# Patient Record
Sex: FEMALE | Race: WHITE | HISPANIC OR LATINO
[De-identification: names, ages, dates, MRNs, and addresses within clinical notes are randomized per-mention and may not be internally consistent; named-entity substitution may affect disease eponyms.]

---

## 2020-08-05 ENCOUNTER — APPOINTMENT (OUTPATIENT)
Dept: INTERNAL MEDICINE | Facility: CLINIC | Age: 20
End: 2020-08-05
Payer: MEDICAID

## 2020-08-05 VITALS
OXYGEN SATURATION: 100 % | DIASTOLIC BLOOD PRESSURE: 68 MMHG | HEIGHT: 65.35 IN | WEIGHT: 163.14 LBS | TEMPERATURE: 98.6 F | SYSTOLIC BLOOD PRESSURE: 121 MMHG | HEART RATE: 89 BPM | BODY MASS INDEX: 26.85 KG/M2

## 2020-08-05 DIAGNOSIS — Z78.9 OTHER SPECIFIED HEALTH STATUS: ICD-10-CM

## 2020-08-05 PROCEDURE — 99385 PREV VISIT NEW AGE 18-39: CPT | Mod: 25

## 2020-08-05 PROCEDURE — 99203 OFFICE O/P NEW LOW 30 MIN: CPT | Mod: 25

## 2020-08-05 PROCEDURE — 36415 COLL VENOUS BLD VENIPUNCTURE: CPT

## 2020-08-07 PROBLEM — Z78.9 CURRENT NON-SMOKER: Status: ACTIVE | Noted: 2020-08-07

## 2020-08-07 PROBLEM — Z78.9 NO KNOWN PROBLEMS: Status: RESOLVED | Noted: 2020-08-07 | Resolved: 2020-08-07

## 2020-08-07 PROBLEM — Z78.9 CURRENT NON-DRINKER OF ALCOHOL: Status: ACTIVE | Noted: 2020-08-07

## 2020-08-07 NOTE — ASSESSMENT
[FreeTextEntry1] : Blood work done today, advised to establish care with gynecologist at the age of 21. Discussed 120,000-calorie intake work deficit for weight loss. Discussed increase activity 300 minutes a week. Check thyroid function levels prolactin levels 24 hour urine cortisol levels her weight gain. Patient does not have features of Cushing's. Consider endocrinology referral if workup unremarkable.

## 2020-08-07 NOTE — HISTORY OF PRESENT ILLNESS
[FreeTextEntry1] : Patient presents for a physical and concern over weight gain [de-identified] : Patient presents for physical concern over weight gain has gained about 20 pounds in 5-6 months.  Denies any changes to diet walks every other day. Strength: Restriction intermittent testing was limited results. Denies any history of obesity. Denies any family history of diabetes. Denies any headaches change in menstrual cycles per patient and change in skin. Has become a little more constipated. Denies any cold intolerance. Denies any over-the-counter medications.

## 2020-08-07 NOTE — HEALTH RISK ASSESSMENT
[Good] : ~his/her~ current health as good [No] : No [0] : 2) Feeling down, depressed, or hopeless: Not at all (0) [] : No [FreeTextEntry1] : weight gain [de-identified] : none [de-identified] : none

## 2020-08-10 ENCOUNTER — APPOINTMENT (OUTPATIENT)
Dept: OPHTHALMOLOGY | Facility: CLINIC | Age: 20
End: 2020-08-10
Payer: MEDICAID

## 2020-08-10 ENCOUNTER — NON-APPOINTMENT (OUTPATIENT)
Age: 20
End: 2020-08-10

## 2020-08-10 LAB
ALBUMIN SERPL ELPH-MCNC: 4.8 G/DL
ALP BLD-CCNC: 82 U/L
ALT SERPL-CCNC: 19 U/L
ANION GAP SERPL CALC-SCNC: 13 MMOL/L
APPEARANCE: CLEAR
APPEARANCE: CLEAR
AST SERPL-CCNC: 21 U/L
BACTERIA: ABNORMAL
BASOPHILS # BLD AUTO: 0.05 K/UL
BASOPHILS NFR BLD AUTO: 0.6 %
BILIRUB SERPL-MCNC: 0.2 MG/DL
BILIRUBIN URINE: NEGATIVE
BILIRUBIN URINE: NEGATIVE
BLOOD URINE: NEGATIVE
BLOOD URINE: NEGATIVE
BUN SERPL-MCNC: 9 MG/DL
CALCIUM SERPL-MCNC: 9.5 MG/DL
CHLORIDE SERPL-SCNC: 104 MMOL/L
CHOLEST SERPL-MCNC: 147 MG/DL
CHOLEST/HDLC SERPL: 2.7 RATIO
CO2 SERPL-SCNC: 24 MMOL/L
COLOR: NORMAL
COLOR: NORMAL
CREAT SERPL-MCNC: 0.81 MG/DL
EOSINOPHIL # BLD AUTO: 0.1 K/UL
EOSINOPHIL NFR BLD AUTO: 1.2 %
ESTIMATED AVERAGE GLUCOSE: 100 MG/DL
GLUCOSE QUALITATIVE U: NEGATIVE
GLUCOSE QUALITATIVE U: NEGATIVE
GLUCOSE SERPL-MCNC: 86 MG/DL
HBA1C MFR BLD HPLC: 5.1 %
HCT VFR BLD CALC: 43.3 %
HDLC SERPL-MCNC: 54 MG/DL
HGB BLD-MCNC: 13.5 G/DL
HYALINE CASTS: 1 /LPF
IGF-1 INTERP: NORMAL
IGF-I BLD-MCNC: 376 NG/ML
IMM GRANULOCYTES NFR BLD AUTO: 0.4 %
KETONES URINE: NEGATIVE
KETONES URINE: NEGATIVE
LDLC SERPL CALC-MCNC: 85 MG/DL
LEUKOCYTE ESTERASE URINE: NEGATIVE
LEUKOCYTE ESTERASE URINE: NEGATIVE
LYMPHOCYTES # BLD AUTO: 2.14 K/UL
LYMPHOCYTES NFR BLD AUTO: 26 %
MAN DIFF?: NORMAL
MCHC RBC-ENTMCNC: 28.8 PG
MCHC RBC-ENTMCNC: 31.2 GM/DL
MCV RBC AUTO: 92.3 FL
MICROSCOPIC-UA: NORMAL
MONOCYTES # BLD AUTO: 0.47 K/UL
MONOCYTES NFR BLD AUTO: 5.7 %
NEUTROPHILS # BLD AUTO: 5.43 K/UL
NEUTROPHILS NFR BLD AUTO: 66.1 %
NITRITE URINE: NEGATIVE
NITRITE URINE: NEGATIVE
PH URINE: 6
PH URINE: 6
PLATELET # BLD AUTO: 243 K/UL
POTASSIUM SERPL-SCNC: 4.4 MMOL/L
PROLACTIN SERPL-MCNC: 20.6 NG/ML
PROT SERPL-MCNC: 7.2 G/DL
PROTEIN URINE: NEGATIVE
PROTEIN URINE: NEGATIVE
RBC # BLD: 4.69 M/UL
RBC # FLD: 12.6 %
RED BLOOD CELLS URINE: 1 /HPF
SODIUM SERPL-SCNC: 141 MMOL/L
SPECIFIC GRAVITY URINE: 1.01
SPECIFIC GRAVITY URINE: 1.01
SQUAMOUS EPITHELIAL CELLS: 7 /HPF
T3FREE SERPL-MCNC: 3.06 PG/ML
T4 FREE SERPL-MCNC: 1.2 NG/DL
TRIGL SERPL-MCNC: 40 MG/DL
TSH SERPL-ACNC: 1.3 UIU/ML
UROBILINOGEN URINE: NORMAL
UROBILINOGEN URINE: NORMAL
WBC # FLD AUTO: 8.22 K/UL
WHITE BLOOD CELLS URINE: 2 /HPF

## 2020-08-10 PROCEDURE — 92004 COMPRE OPH EXAM NEW PT 1/>: CPT

## 2020-08-10 PROCEDURE — 92015 DETERMINE REFRACTIVE STATE: CPT

## 2020-08-16 LAB
CORTIS 24H UR-MCNC: 24 H
CORTIS 24H UR-MRATE: 23 MCG/24 H
SPECIMEN VOL 24H UR: 2075 ML

## 2021-04-26 ENCOUNTER — NON-APPOINTMENT (OUTPATIENT)
Age: 21
End: 2021-04-26

## 2021-04-26 ENCOUNTER — APPOINTMENT (OUTPATIENT)
Dept: INTERNAL MEDICINE | Facility: CLINIC | Age: 21
End: 2021-04-26
Payer: MEDICAID

## 2021-04-26 VITALS
WEIGHT: 160.49 LBS | SYSTOLIC BLOOD PRESSURE: 113 MMHG | BODY MASS INDEX: 26.42 KG/M2 | HEART RATE: 81 BPM | HEIGHT: 65.35 IN | DIASTOLIC BLOOD PRESSURE: 70 MMHG | OXYGEN SATURATION: 98 % | TEMPERATURE: 98.4 F

## 2021-04-26 DIAGNOSIS — R07.9 CHEST PAIN, UNSPECIFIED: ICD-10-CM

## 2021-04-26 PROCEDURE — 99213 OFFICE O/P EST LOW 20 MIN: CPT | Mod: 25

## 2021-04-26 PROCEDURE — 99072 ADDL SUPL MATRL&STAF TM PHE: CPT

## 2021-04-26 PROCEDURE — 93000 ELECTROCARDIOGRAM COMPLETE: CPT

## 2021-04-27 ENCOUNTER — APPOINTMENT (OUTPATIENT)
Dept: ENDOCRINOLOGY | Facility: CLINIC | Age: 21
End: 2021-04-27

## 2021-04-29 LAB
BASOPHILS # BLD AUTO: 0.06 K/UL
BASOPHILS NFR BLD AUTO: 0.9 %
DEPRECATED D DIMER PPP IA-ACNC: <150 NG/ML DDU
EOSINOPHIL # BLD AUTO: 0.1 K/UL
EOSINOPHIL NFR BLD AUTO: 1.5 %
HCT VFR BLD CALC: 43.4 %
HGB BLD-MCNC: 14.1 G/DL
IMM GRANULOCYTES NFR BLD AUTO: 0.7 %
LYMPHOCYTES # BLD AUTO: 2.14 K/UL
LYMPHOCYTES NFR BLD AUTO: 32 %
MAN DIFF?: NORMAL
MCHC RBC-ENTMCNC: 29.6 PG
MCHC RBC-ENTMCNC: 32.5 GM/DL
MCV RBC AUTO: 91 FL
MONOCYTES # BLD AUTO: 0.53 K/UL
MONOCYTES NFR BLD AUTO: 7.9 %
NEUTROPHILS # BLD AUTO: 3.8 K/UL
NEUTROPHILS NFR BLD AUTO: 57 %
PLATELET # BLD AUTO: 251 K/UL
RBC # BLD: 4.77 M/UL
RBC # FLD: 12.2 %
TSH SERPL-ACNC: 2.04 UIU/ML
WBC # FLD AUTO: 6.68 K/UL

## 2021-05-01 NOTE — ASSESSMENT
[FreeTextEntry1] : Advised to follow-up with endocrinology given elevated IGF-I, chest pain may been secondary to anxiety state versus muscular, pretest probability for ACS very low no signs of hypervolemia on exam.  Will assess D-dimer thyroid function test and CBC today.

## 2021-05-01 NOTE — REVIEW OF SYSTEMS
[Palpitations] : no palpitations [Leg Claudication] : no leg claudication [Lower Ext Edema] : no lower extremity edema [Orthopnea] : no orthopnea [Negative] : Heme/Lymph

## 2021-05-01 NOTE — HISTORY OF PRESENT ILLNESS
[FreeTextEntry1] : Patient presents to the office would like to discuss weight [de-identified] : Patient presents to the office will like to discuss weight also did have an episode of left arm discomfort while driving lasted for a few minutes, denies any associated palpitations lightheadedness dizziness or pleuritic chest pain.  Denies any feelings of syncope or presyncope.  Has not been to endocrinologist yet denies any stress or reflux cough fevers.

## 2021-05-27 ENCOUNTER — APPOINTMENT (OUTPATIENT)
Dept: INTERNAL MEDICINE | Facility: CLINIC | Age: 21
End: 2021-05-27
Payer: MEDICAID

## 2021-05-27 VITALS
TEMPERATURE: 97.1 F | OXYGEN SATURATION: 98 % | WEIGHT: 151.11 LBS | HEART RATE: 79 BPM | BODY MASS INDEX: 25.48 KG/M2 | SYSTOLIC BLOOD PRESSURE: 110 MMHG | HEIGHT: 64.76 IN | DIASTOLIC BLOOD PRESSURE: 66 MMHG

## 2021-05-27 DIAGNOSIS — Z71.3 DIETARY COUNSELING AND SURVEILLANCE: ICD-10-CM

## 2021-05-27 PROCEDURE — 99213 OFFICE O/P EST LOW 20 MIN: CPT

## 2021-05-28 ENCOUNTER — TRANSCRIPTION ENCOUNTER (OUTPATIENT)
Age: 21
End: 2021-05-28

## 2021-06-02 PROBLEM — Z71.3 WEIGHT LOSS COUNSELING, ENCOUNTER FOR: Status: ACTIVE | Noted: 2021-04-29

## 2021-06-02 NOTE — HISTORY OF PRESENT ILLNESS
[FreeTextEntry1] : Patient presents for follow-up for weight management [de-identified] : Has tolerated medication well, had some dry mouth however has improved.  Denies any chest pain tightness shortness of breath denies any palpitations headache.  Denies any sleep difficulty.

## 2021-06-02 NOTE — ASSESSMENT
[FreeTextEntry1] : Blood pressure is stable and has lost 9 pounds, continue current management return to the office in 3 months.  Best goal of 10% weight loss.

## 2021-06-23 ENCOUNTER — APPOINTMENT (OUTPATIENT)
Dept: ENDOCRINOLOGY | Facility: CLINIC | Age: 21
End: 2021-06-23

## 2021-07-30 ENCOUNTER — APPOINTMENT (OUTPATIENT)
Dept: INTERNAL MEDICINE | Facility: CLINIC | Age: 21
End: 2021-07-30
Payer: MEDICAID

## 2021-07-30 VITALS
WEIGHT: 145.16 LBS | TEMPERATURE: 98 F | OXYGEN SATURATION: 97 % | BODY MASS INDEX: 24.18 KG/M2 | HEIGHT: 64.96 IN | SYSTOLIC BLOOD PRESSURE: 99 MMHG | DIASTOLIC BLOOD PRESSURE: 64 MMHG | HEART RATE: 90 BPM

## 2021-07-30 PROCEDURE — 99395 PREV VISIT EST AGE 18-39: CPT | Mod: 25

## 2021-07-30 RX ORDER — PHENTERMINE HYDROCHLORIDE 15 MG/1
15 CAPSULE ORAL
Qty: 30 | Refills: 0 | Status: DISCONTINUED | COMMUNITY
Start: 2021-04-29 | End: 2021-07-30

## 2021-08-04 LAB
25(OH)D3 SERPL-MCNC: 21.4 NG/ML
ALBUMIN SERPL ELPH-MCNC: 4.6 G/DL
ALP BLD-CCNC: 74 U/L
ALT SERPL-CCNC: 12 U/L
ANION GAP SERPL CALC-SCNC: 11 MMOL/L
APPEARANCE: CLEAR
APPEARANCE: CLEAR
AST SERPL-CCNC: 16 U/L
BACTERIA: NEGATIVE
BASOPHILS # BLD AUTO: 0.04 K/UL
BASOPHILS NFR BLD AUTO: 0.6 %
BILIRUB SERPL-MCNC: 0.2 MG/DL
BILIRUBIN URINE: NEGATIVE
BILIRUBIN URINE: NEGATIVE
BLOOD URINE: NEGATIVE
BLOOD URINE: NEGATIVE
BUN SERPL-MCNC: 9 MG/DL
CALCIUM SERPL-MCNC: 9.8 MG/DL
CHLORIDE SERPL-SCNC: 102 MMOL/L
CHOLEST SERPL-MCNC: 144 MG/DL
CO2 SERPL-SCNC: 24 MMOL/L
COLOR: YELLOW
COLOR: YELLOW
CREAT SERPL-MCNC: 0.77 MG/DL
EOSINOPHIL # BLD AUTO: 0.08 K/UL
EOSINOPHIL NFR BLD AUTO: 1.2 %
ESTIMATED AVERAGE GLUCOSE: 94 MG/DL
GLUCOSE QUALITATIVE U: NEGATIVE
GLUCOSE QUALITATIVE U: NEGATIVE
GLUCOSE SERPL-MCNC: 71 MG/DL
HBA1C MFR BLD HPLC: 4.9 %
HCT VFR BLD CALC: 39.5 %
HDLC SERPL-MCNC: 49 MG/DL
HGB BLD-MCNC: 13.2 G/DL
HYALINE CASTS: 3 /LPF
IMM GRANULOCYTES NFR BLD AUTO: 0.4 %
KETONES URINE: NORMAL
KETONES URINE: NORMAL
LDLC SERPL CALC-MCNC: 88 MG/DL
LEUKOCYTE ESTERASE URINE: NEGATIVE
LEUKOCYTE ESTERASE URINE: NEGATIVE
LYMPHOCYTES # BLD AUTO: 2.08 K/UL
LYMPHOCYTES NFR BLD AUTO: 30.8 %
MAN DIFF?: NORMAL
MCHC RBC-ENTMCNC: 30.2 PG
MCHC RBC-ENTMCNC: 33.4 GM/DL
MCV RBC AUTO: 90.4 FL
MICROSCOPIC-UA: NORMAL
MONOCYTES # BLD AUTO: 0.52 K/UL
MONOCYTES NFR BLD AUTO: 7.7 %
NEUTROPHILS # BLD AUTO: 4.01 K/UL
NEUTROPHILS NFR BLD AUTO: 59.3 %
NITRITE URINE: NEGATIVE
NITRITE URINE: NEGATIVE
NONHDLC SERPL-MCNC: 95 MG/DL
PH URINE: 5.5
PH URINE: 5.5
PLATELET # BLD AUTO: 233 K/UL
POTASSIUM SERPL-SCNC: 4.6 MMOL/L
PROT SERPL-MCNC: 7 G/DL
PROTEIN URINE: ABNORMAL
PROTEIN URINE: ABNORMAL
RBC # BLD: 4.37 M/UL
RBC # FLD: 12.7 %
RED BLOOD CELLS URINE: 1 /HPF
SODIUM SERPL-SCNC: 136 MMOL/L
SPECIFIC GRAVITY URINE: 1.03
SPECIFIC GRAVITY URINE: 1.03
SQUAMOUS EPITHELIAL CELLS: 2 /HPF
TRIGL SERPL-MCNC: 37 MG/DL
TSH SERPL-ACNC: 1.62 UIU/ML
UROBILINOGEN URINE: NORMAL
UROBILINOGEN URINE: NORMAL
WBC # FLD AUTO: 6.76 K/UL
WHITE BLOOD CELLS URINE: 1 /HPF

## 2021-08-08 NOTE — ASSESSMENT
[FreeTextEntry1] : Blood work done today, congratulated patient on weight loss.  Advised stretching exercises exercise for right shoulder pain.

## 2021-08-08 NOTE — HEALTH RISK ASSESSMENT
[Very Good] : ~his/her~  mood as very good [FreeTextEntry1] : health maintenance [] : No [No] : No [0] : 2) Feeling down, depressed, or hopeless: Not at all (0) [de-identified] : none

## 2021-08-08 NOTE — PHYSICAL EXAM
[Normal] : affect was normal and insight and judgment were intact [de-identified] : myofascial tenderness, right shoulder

## 2021-11-05 ENCOUNTER — APPOINTMENT (OUTPATIENT)
Dept: INTERNAL MEDICINE | Facility: CLINIC | Age: 21
End: 2021-11-05
Payer: MEDICAID

## 2021-11-05 VITALS
HEIGHT: 65.28 IN | TEMPERATURE: 96.9 F | OXYGEN SATURATION: 99 % | BODY MASS INDEX: 24.58 KG/M2 | DIASTOLIC BLOOD PRESSURE: 68 MMHG | HEART RATE: 77 BPM | SYSTOLIC BLOOD PRESSURE: 102 MMHG | WEIGHT: 149.34 LBS

## 2021-11-05 PROCEDURE — 99213 OFFICE O/P EST LOW 20 MIN: CPT

## 2021-11-05 NOTE — HISTORY OF PRESENT ILLNESS
[FreeTextEntry8] : Patient presents to the office due to right upper back pain\par Has been occurring for approximately 1 week denies any inciting events worse with movement denies any fevers chills radicular symptoms weakness numbness\par Denies any history of trauma

## 2021-11-05 NOTE — PHYSICAL EXAM
[Normal] : normal gait, coordination grossly intact, no focal deficits and deep tendon reflexes were 2+ and symmetric [de-identified] : muscular spasm below right scapula

## 2021-11-05 NOTE — ASSESSMENT
[FreeTextEntry1] :  back spasm-pt, stretching exercises, muscle relaxant given discussed potential side effects\par call office if no improvement

## 2021-11-16 ENCOUNTER — TRANSCRIPTION ENCOUNTER (OUTPATIENT)
Age: 21
End: 2021-11-16

## 2021-12-02 ENCOUNTER — APPOINTMENT (OUTPATIENT)
Dept: INTERNAL MEDICINE | Facility: CLINIC | Age: 21
End: 2021-12-02

## 2021-12-15 ENCOUNTER — TRANSCRIPTION ENCOUNTER (OUTPATIENT)
Age: 21
End: 2021-12-15

## 2022-01-12 ENCOUNTER — NON-APPOINTMENT (OUTPATIENT)
Age: 22
End: 2022-01-12

## 2022-01-14 ENCOUNTER — APPOINTMENT (OUTPATIENT)
Dept: INTERNAL MEDICINE | Facility: CLINIC | Age: 22
End: 2022-01-14
Payer: MEDICAID

## 2022-01-14 VITALS
HEART RATE: 76 BPM | BODY MASS INDEX: 24.83 KG/M2 | DIASTOLIC BLOOD PRESSURE: 76 MMHG | OXYGEN SATURATION: 99 % | TEMPERATURE: 98.1 F | HEIGHT: 65 IN | SYSTOLIC BLOOD PRESSURE: 117 MMHG | WEIGHT: 149 LBS

## 2022-01-14 DIAGNOSIS — L50.9 URTICARIA, UNSPECIFIED: ICD-10-CM

## 2022-01-14 PROCEDURE — 99213 OFFICE O/P EST LOW 20 MIN: CPT | Mod: 25

## 2022-01-14 RX ORDER — CYCLOBENZAPRINE HYDROCHLORIDE 5 MG/1
5 TABLET, FILM COATED ORAL
Qty: 15 | Refills: 0 | Status: DISCONTINUED | COMMUNITY
Start: 2021-11-05 | End: 2022-01-14

## 2022-01-14 NOTE — ASSESSMENT
[FreeTextEntry1] : Urticaria possibly secondary to the viral infection or NSAID use\par Currently improved\par If recurs to see allergist\par CBC CMP TSH inflammatory markers ordered\par Continues to have some congestion,-advised Flonase no maxillary tooth ache fevers chills

## 2022-01-14 NOTE — HISTORY OF PRESENT ILLNESS
[FreeTextEntry1] : Patient presents to the office for urticaria [de-identified] : Occurred shortly after COVID, had red raised spots, had swelling of lower lip improved with Benadryl currently feels better denies any joint pains muscle aches\par Was taking NSAIDs denies any changes in food changes in creams lotions detergents

## 2022-01-20 LAB
ALBUMIN SERPL ELPH-MCNC: 4.3 G/DL
ALP BLD-CCNC: 72 U/L
ALT SERPL-CCNC: 17 U/L
ANION GAP SERPL CALC-SCNC: 12 MMOL/L
AST SERPL-CCNC: 15 U/L
BASOPHILS # BLD AUTO: 0.05 K/UL
BASOPHILS NFR BLD AUTO: 0.8 %
BILIRUB SERPL-MCNC: 0.2 MG/DL
BUN SERPL-MCNC: 6 MG/DL
CALCIUM SERPL-MCNC: 9.4 MG/DL
CHLORIDE SERPL-SCNC: 106 MMOL/L
CO2 SERPL-SCNC: 24 MMOL/L
CREAT SERPL-MCNC: 0.9 MG/DL
EOSINOPHIL # BLD AUTO: 0.15 K/UL
EOSINOPHIL NFR BLD AUTO: 2.4 %
ERYTHROCYTE [SEDIMENTATION RATE] IN BLOOD BY WESTERGREN METHOD: < 2 MM/HR
GLUCOSE SERPL-MCNC: 95 MG/DL
HCT VFR BLD CALC: 39.4 %
HGB BLD-MCNC: 13.1 G/DL
IMM GRANULOCYTES NFR BLD AUTO: 0.3 %
LYMPHOCYTES # BLD AUTO: 1.75 K/UL
LYMPHOCYTES NFR BLD AUTO: 27.8 %
MAN DIFF?: NORMAL
MCHC RBC-ENTMCNC: 30.5 PG
MCHC RBC-ENTMCNC: 33.2 GM/DL
MCV RBC AUTO: 91.8 FL
MONOCYTES # BLD AUTO: 0.42 K/UL
MONOCYTES NFR BLD AUTO: 6.7 %
NEUTROPHILS # BLD AUTO: 3.91 K/UL
NEUTROPHILS NFR BLD AUTO: 62 %
PLATELET # BLD AUTO: 263 K/UL
POTASSIUM SERPL-SCNC: 4.8 MMOL/L
PROT SERPL-MCNC: 6.7 G/DL
RBC # BLD: 4.29 M/UL
RBC # FLD: 13 %
SODIUM SERPL-SCNC: 141 MMOL/L
TSH SERPL-ACNC: 1.54 UIU/ML
WBC # FLD AUTO: 6.3 K/UL

## 2022-02-17 ENCOUNTER — TRANSCRIPTION ENCOUNTER (OUTPATIENT)
Age: 22
End: 2022-02-17

## 2022-03-28 ENCOUNTER — NON-APPOINTMENT (OUTPATIENT)
Age: 22
End: 2022-03-28

## 2022-03-28 ENCOUNTER — APPOINTMENT (OUTPATIENT)
Dept: ENDOCRINOLOGY | Facility: CLINIC | Age: 22
End: 2022-03-28
Payer: MEDICAID

## 2022-03-28 VITALS
DIASTOLIC BLOOD PRESSURE: 75 MMHG | SYSTOLIC BLOOD PRESSURE: 122 MMHG | OXYGEN SATURATION: 96 % | HEIGHT: 65 IN | WEIGHT: 149.91 LBS | BODY MASS INDEX: 24.98 KG/M2 | HEART RATE: 75 BPM

## 2022-03-28 DIAGNOSIS — R79.89 OTHER SPECIFIED ABNORMAL FINDINGS OF BLOOD CHEMISTRY: ICD-10-CM

## 2022-03-28 DIAGNOSIS — E55.9 VITAMIN D DEFICIENCY, UNSPECIFIED: ICD-10-CM

## 2022-03-28 PROCEDURE — 99202 OFFICE O/P NEW SF 15 MIN: CPT

## 2022-03-28 NOTE — REVIEW OF SYSTEMS
[Constipation] : constipation [Gas/Bloating] : gas/bloating [All other systems negative] : All other systems negative [FreeTextEntry2] : Increase in height

## 2022-03-28 NOTE — ASSESSMENT
[FreeTextEntry1] : This is a 22-year-old female with vitamin D insufficiency, here for evaluation of elevated IGF-I level.\par She had routine blood work in August 2020 which showed IGF-1 level of 376.\par She states she has noticed increase in height.  Denies change in hands, feet, or jaw.\par Check repeat IGF-1. No clinical signs of acromegaly.  \par She is not currently taking vitamin D. Check 25 vit D. \par

## 2022-03-28 NOTE — PHYSICAL EXAM
[Alert] : alert [Well Nourished] : well nourished [Healthy Appearance] : healthy appearance [No Acute Distress] : no acute distress [Well Developed] : well developed [Normal Voice/Communication] : normal voice communication [Normal Sclera/Conjunctiva] : normal sclera/conjunctiva [No Proptosis] : no proptosis [No Neck Mass] : no neck mass was observed [No LAD] : no lymphadenopathy [Supple] : the neck was supple [Thyroid Not Enlarged] : the thyroid was not enlarged [No Respiratory Distress] : no respiratory distress [Normal Rate] : heart rate was normal [Normal Affect] : the affect was normal [Normal Insight/Judgement] : insight and judgment were intact [Normal Mood] : the mood was normal

## 2022-03-28 NOTE — HISTORY OF PRESENT ILLNESS
[FreeTextEntry1] : CC: Abnormal hormone level.\par This is a 22-year-old female with vitamin D insufficiency, here for evaluation of elevated IGF-I level.\par She had routine blood work in August 2020 which showed IGF-1 level of 376.\par She states she has noticed increase in height.  Denies change in hands, feet, or jaw.\par 24-hour urine cortisol normal in the past.\par She is not currently taking vitamin D.\par She reports weight loss on phentermine.\par She has regular menstrual periods.\par She is not currently trying to conceive.

## 2022-05-02 ENCOUNTER — TRANSCRIPTION ENCOUNTER (OUTPATIENT)
Age: 22
End: 2022-05-02

## 2022-06-24 ENCOUNTER — TRANSCRIPTION ENCOUNTER (OUTPATIENT)
Age: 22
End: 2022-06-24

## 2022-08-12 ENCOUNTER — APPOINTMENT (OUTPATIENT)
Dept: INTERNAL MEDICINE | Facility: CLINIC | Age: 22
End: 2022-08-12

## 2022-08-12 ENCOUNTER — NON-APPOINTMENT (OUTPATIENT)
Age: 22
End: 2022-08-12

## 2022-08-12 VITALS
OXYGEN SATURATION: 98 % | HEART RATE: 75 BPM | BODY MASS INDEX: 25.33 KG/M2 | TEMPERATURE: 97.3 F | WEIGHT: 152 LBS | HEIGHT: 65 IN | SYSTOLIC BLOOD PRESSURE: 123 MMHG | DIASTOLIC BLOOD PRESSURE: 80 MMHG

## 2022-08-12 DIAGNOSIS — M25.511 PAIN IN RIGHT SHOULDER: ICD-10-CM

## 2022-08-12 PROCEDURE — 99395 PREV VISIT EST AGE 18-39: CPT | Mod: 25

## 2022-08-12 PROCEDURE — 36415 COLL VENOUS BLD VENIPUNCTURE: CPT

## 2022-08-12 NOTE — ASSESSMENT
[FreeTextEntry1] : Annual Physical Exam\par -Blood work done today\par -Check A1c, lipid panel and Vitamin levels.\par -Physical Exam is WNL.\par -Restart Phentermine.\par -Physical Therapy Referral given for Shoulder pain.\par -RTO annually or as needed

## 2022-08-12 NOTE — ADDENDUM
[FreeTextEntry1] : I, Lorraine Graf, acted as a scribe on behalf of Dr. Gray Alarcon MD, on 08/12/2022. \par \par All medical entries made by the scribe were at my, Dr. Gray Alarcon MD, direction and personally dictated by me on 08/12/2022. I have reviewed the chart and agree that the record accurately reflects my personal performance of the history, physical exam, assessment and plan. I have also personally directed, reviewed, and agreed with the chart.

## 2022-08-12 NOTE — HISTORY OF PRESENT ILLNESS
[FreeTextEntry1] : Patient presents for annual physical exam. [de-identified] : Patient is doing well overall and has not gotten sick since last visit.\par The last time she took Phentermine was end of May. Inquiring when can she take it again. \par Pt states she lost weight with medication.\par Underwent Physical Therapy for shoulder pain with alleviation. Requesting for new referral.\par Denies any CP, Chest tightness or SOB.\par Denies any abdominal pain, urinary symptom or change in bowel habits.\par Denies any fever, night sweats, or chills.\par \par Notes of some anxiety however manageable.\par UTD with GYN.

## 2022-08-16 LAB
25(OH)D3 SERPL-MCNC: 25.1 NG/ML
ALBUMIN SERPL ELPH-MCNC: 4.6 G/DL
ALP BLD-CCNC: 73 U/L
ALT SERPL-CCNC: 18 U/L
ANION GAP SERPL CALC-SCNC: 12 MMOL/L
APPEARANCE: CLEAR
AST SERPL-CCNC: 18 U/L
BACTERIA: NEGATIVE
BASOPHILS # BLD AUTO: 0.05 K/UL
BASOPHILS NFR BLD AUTO: 0.8 %
BILIRUB SERPL-MCNC: 0.4 MG/DL
BILIRUBIN URINE: NEGATIVE
BLOOD URINE: NEGATIVE
BUN SERPL-MCNC: 7 MG/DL
CALCIUM SERPL-MCNC: 9.5 MG/DL
CHLORIDE SERPL-SCNC: 104 MMOL/L
CHOLEST SERPL-MCNC: 156 MG/DL
CO2 SERPL-SCNC: 22 MMOL/L
COLOR: NORMAL
CREAT SERPL-MCNC: 0.72 MG/DL
EGFR: 121 ML/MIN/1.73M2
EOSINOPHIL # BLD AUTO: 0.17 K/UL
EOSINOPHIL NFR BLD AUTO: 2.6 %
ESTIMATED AVERAGE GLUCOSE: 103 MG/DL
GLUCOSE QUALITATIVE U: NEGATIVE
GLUCOSE SERPL-MCNC: 77 MG/DL
HBA1C MFR BLD HPLC: 5.2 %
HCT VFR BLD CALC: 41.8 %
HDLC SERPL-MCNC: 57 MG/DL
HGB BLD-MCNC: 13.4 G/DL
HYALINE CASTS: 1 /LPF
IMM GRANULOCYTES NFR BLD AUTO: 0.5 %
KETONES URINE: ABNORMAL
LDLC SERPL CALC-MCNC: 91 MG/DL
LEUKOCYTE ESTERASE URINE: NEGATIVE
LYMPHOCYTES # BLD AUTO: 1.88 K/UL
LYMPHOCYTES NFR BLD AUTO: 29.1 %
MAN DIFF?: NORMAL
MCHC RBC-ENTMCNC: 28.5 PG
MCHC RBC-ENTMCNC: 32.1 GM/DL
MCV RBC AUTO: 88.9 FL
MICROSCOPIC-UA: NORMAL
MONOCYTES # BLD AUTO: 0.47 K/UL
MONOCYTES NFR BLD AUTO: 7.3 %
NEUTROPHILS # BLD AUTO: 3.85 K/UL
NEUTROPHILS NFR BLD AUTO: 59.7 %
NITRITE URINE: NEGATIVE
NONHDLC SERPL-MCNC: 99 MG/DL
PH URINE: 6.5
PLATELET # BLD AUTO: 265 K/UL
POTASSIUM SERPL-SCNC: 4.4 MMOL/L
PROT SERPL-MCNC: 7.3 G/DL
PROTEIN URINE: NEGATIVE
RBC # BLD: 4.7 M/UL
RBC # FLD: 13 %
RED BLOOD CELLS URINE: 1 /HPF
SODIUM SERPL-SCNC: 138 MMOL/L
SPECIFIC GRAVITY URINE: 1.01
SQUAMOUS EPITHELIAL CELLS: 2 /HPF
TRIGL SERPL-MCNC: 40 MG/DL
TSH SERPL-ACNC: 2.69 UIU/ML
UROBILINOGEN URINE: NORMAL
VIT B12 SERPL-MCNC: 490 PG/ML
WBC # FLD AUTO: 6.45 K/UL
WHITE BLOOD CELLS URINE: 1 /HPF

## 2022-08-29 ENCOUNTER — APPOINTMENT (OUTPATIENT)
Dept: INTERNAL MEDICINE | Facility: CLINIC | Age: 22
End: 2022-08-29

## 2022-08-29 DIAGNOSIS — Z00.00 ENCOUNTER FOR GENERAL ADULT MEDICAL EXAMINATION W/OUT ABNORMAL FINDINGS: ICD-10-CM

## 2022-08-29 PROCEDURE — 36415 COLL VENOUS BLD VENIPUNCTURE: CPT

## 2022-08-31 ENCOUNTER — NON-APPOINTMENT (OUTPATIENT)
Age: 22
End: 2022-08-31

## 2022-08-31 LAB
HBV SURFACE AB SER QL: NONREACTIVE
MEV IGG FLD QL IA: 241 AU/ML
MEV IGG+IGM SER-IMP: POSITIVE
MUV AB SER-ACNC: POSITIVE
MUV IGG SER QL IA: 68.4 AU/ML
RUBV IGG FLD-ACNC: 2.8 INDEX
RUBV IGG SER-IMP: POSITIVE
VZV AB TITR SER: POSITIVE
VZV IGG SER IF-ACNC: 1381 INDEX

## 2022-09-02 ENCOUNTER — NON-APPOINTMENT (OUTPATIENT)
Age: 22
End: 2022-09-02

## 2022-09-02 LAB
M TB IFN-G BLD-IMP: NEGATIVE
QUANTIFERON TB PLUS MITOGEN MINUS NIL: 9.3 IU/ML
QUANTIFERON TB PLUS NIL: 0.02 IU/ML
QUANTIFERON TB PLUS TB1 MINUS NIL: 0 IU/ML
QUANTIFERON TB PLUS TB2 MINUS NIL: 0 IU/ML

## 2022-09-07 ENCOUNTER — APPOINTMENT (OUTPATIENT)
Dept: INTERNAL MEDICINE | Facility: CLINIC | Age: 22
End: 2022-09-07

## 2022-09-07 DIAGNOSIS — Z23 ENCOUNTER FOR IMMUNIZATION: ICD-10-CM

## 2022-09-07 PROCEDURE — 90746 HEPB VACCINE 3 DOSE ADULT IM: CPT

## 2022-09-07 PROCEDURE — 90471 IMMUNIZATION ADMIN: CPT

## 2022-11-09 ENCOUNTER — APPOINTMENT (OUTPATIENT)
Dept: INTERNAL MEDICINE | Facility: CLINIC | Age: 22
End: 2022-11-09

## 2023-01-12 ENCOUNTER — TRANSCRIPTION ENCOUNTER (OUTPATIENT)
Age: 23
End: 2023-01-12

## 2023-03-06 ENCOUNTER — APPOINTMENT (OUTPATIENT)
Dept: INTERNAL MEDICINE | Facility: CLINIC | Age: 23
End: 2023-03-06

## 2023-03-17 ENCOUNTER — TRANSCRIPTION ENCOUNTER (OUTPATIENT)
Age: 23
End: 2023-03-17

## 2023-06-12 ENCOUNTER — TRANSCRIPTION ENCOUNTER (OUTPATIENT)
Age: 23
End: 2023-06-12

## 2023-07-28 ENCOUNTER — TRANSCRIPTION ENCOUNTER (OUTPATIENT)
Age: 23
End: 2023-07-28

## 2023-08-31 ENCOUNTER — LABORATORY RESULT (OUTPATIENT)
Age: 23
End: 2023-08-31

## 2023-08-31 ENCOUNTER — APPOINTMENT (OUTPATIENT)
Dept: INTERNAL MEDICINE | Facility: CLINIC | Age: 23
End: 2023-08-31
Payer: MEDICAID

## 2023-08-31 VITALS
HEIGHT: 65 IN | HEART RATE: 75 BPM | OXYGEN SATURATION: 98 % | WEIGHT: 175 LBS | TEMPERATURE: 98 F | DIASTOLIC BLOOD PRESSURE: 79 MMHG | BODY MASS INDEX: 29.16 KG/M2 | SYSTOLIC BLOOD PRESSURE: 118 MMHG

## 2023-08-31 DIAGNOSIS — M62.830 MUSCLE SPASM OF BACK: ICD-10-CM

## 2023-08-31 DIAGNOSIS — Z00.00 ENCOUNTER FOR GENERAL ADULT MEDICAL EXAMINATION W/OUT ABNORMAL FINDINGS: ICD-10-CM

## 2023-08-31 PROCEDURE — 99395 PREV VISIT EST AGE 18-39: CPT | Mod: 25

## 2023-08-31 RX ORDER — PHENTERMINE HYDROCHLORIDE 30 MG/1
30 CAPSULE ORAL
Qty: 30 | Refills: 0 | Status: DISCONTINUED | COMMUNITY
Start: 2021-11-16 | End: 2023-08-31

## 2023-08-31 NOTE — ADDENDUM
[FreeTextEntry1] : I, Wilfredxochilt Kristyn Pacheco, acted as a scribe on behalf of Dr. Gray Alarcon MD, on 08/31/2023.   All medical entries made by the scribe were at my, Dr. Gray Alarcon MD, direction and personally dictated by me on 08/31/2023. I have reviewed the chart and agree that the record accurately reflects my personal performance of the history, physical exam, assessment and plan. I have also personally directed, reviewed, and agreed with the chart.

## 2023-08-31 NOTE — ASSESSMENT
[FreeTextEntry1] : Annual Physical Exam -BP is stable. -Check A1c, lipid panel and Vitamin levels. -Check TFTs. -Physical Therapy referral provided. -Discussed possibility of starting Wegovy in the future, also discussed potential side-effects. Pt verbalized understanding. Waiting for blood work result.  -Continue with healthy diet and weight management -RTO annually or as needed

## 2023-08-31 NOTE — HISTORY OF PRESENT ILLNESS
[FreeTextEntry1] : Patient presents for annual physical exam. [de-identified] : Patient is doing well overall. Pt c/o "ear popping" R > L. She has been swimming and notes she does grind her teeth. Denies any recent flights, pain, drainage, pruritus, congestion, SOB. Continues to have back issues and is worse in the morning. Underwent Physical Therapy which shown benefits. She has been doing yoga and stretching exercises. Denies any weakness, numbness, joint pains.  For the last 2 months and noticed she has gained weight while on phentermine. States she has been watching her diet and increased her exercise. Plans to f/u with GYN.

## 2023-08-31 NOTE — HEALTH RISK ASSESSMENT
[Good] : ~his/her~  mood as  good [No] : In the past 12 months have you used drugs other than those required for medical reasons? No [Never] : Never [FreeTextEntry1] : Health Maintenance

## 2023-08-31 NOTE — REVIEW OF SYSTEMS
[Recent Change In Weight] : ~T recent weight change [Negative] : Heme/Lymph [FreeTextEntry4] : Tinnitus [FreeTextEntry9] : Back pain

## 2023-09-06 ENCOUNTER — TRANSCRIPTION ENCOUNTER (OUTPATIENT)
Age: 23
End: 2023-09-06

## 2023-09-06 LAB
25(OH)D3 SERPL-MCNC: 18.6 NG/ML
ALBUMIN SERPL ELPH-MCNC: 4.6 G/DL
ALP BLD-CCNC: 76 U/L
ALT SERPL-CCNC: 19 U/L
ANION GAP SERPL CALC-SCNC: 12 MMOL/L
APPEARANCE: CLEAR
AST SERPL-CCNC: 18 U/L
BASOPHILS # BLD AUTO: 0.05 K/UL
BASOPHILS NFR BLD AUTO: 1 %
BILIRUB SERPL-MCNC: 0.4 MG/DL
BILIRUBIN URINE: NEGATIVE
BLOOD URINE: ABNORMAL
BUN SERPL-MCNC: 9 MG/DL
CALCIUM SERPL-MCNC: 9.4 MG/DL
CHLORIDE SERPL-SCNC: 104 MMOL/L
CHOLEST SERPL-MCNC: 149 MG/DL
CO2 SERPL-SCNC: 24 MMOL/L
COLOR: YELLOW
CREAT SERPL-MCNC: 0.68 MG/DL
EGFR: 125 ML/MIN/1.73M2
EOSINOPHIL # BLD AUTO: 0.11 K/UL
EOSINOPHIL NFR BLD AUTO: 2.2 %
ESTIMATED AVERAGE GLUCOSE: 103 MG/DL
GLUCOSE QUALITATIVE U: NEGATIVE MG/DL
GLUCOSE SERPL-MCNC: 80 MG/DL
HBA1C MFR BLD HPLC: 5.2 %
HCT VFR BLD CALC: 41.9 %
HDLC SERPL-MCNC: 47 MG/DL
HGB BLD-MCNC: 13.8 G/DL
IMM GRANULOCYTES NFR BLD AUTO: 0.6 %
KETONES URINE: NEGATIVE MG/DL
LDLC SERPL CALC-MCNC: 94 MG/DL
LEUKOCYTE ESTERASE URINE: NEGATIVE
LYMPHOCYTES # BLD AUTO: 1.54 K/UL
LYMPHOCYTES NFR BLD AUTO: 31.4 %
MAN DIFF?: NORMAL
MCHC RBC-ENTMCNC: 30.7 PG
MCHC RBC-ENTMCNC: 32.9 GM/DL
MCV RBC AUTO: 93.1 FL
MONOCYTES # BLD AUTO: 0.37 K/UL
MONOCYTES NFR BLD AUTO: 7.5 %
NEUTROPHILS # BLD AUTO: 2.81 K/UL
NEUTROPHILS NFR BLD AUTO: 57.3 %
NITRITE URINE: NEGATIVE
NONHDLC SERPL-MCNC: 102 MG/DL
PH URINE: 5.5
PLATELET # BLD AUTO: 236 K/UL
POTASSIUM SERPL-SCNC: 4.6 MMOL/L
PROT SERPL-MCNC: 7.1 G/DL
PROTEIN URINE: NORMAL MG/DL
RBC # BLD: 4.5 M/UL
RBC # FLD: 12.7 %
SODIUM SERPL-SCNC: 140 MMOL/L
SPECIFIC GRAVITY URINE: 1.02
THYROGLOB AB SERPL-ACNC: <20 IU/ML
THYROPEROXIDASE AB SERPL IA-ACNC: <10 IU/ML
TRIGL SERPL-MCNC: 37 MG/DL
TSH SERPL-ACNC: 1.28 UIU/ML
UROBILINOGEN URINE: 0.2 MG/DL
VIT B12 SERPL-MCNC: 641 PG/ML
WBC # FLD AUTO: 4.91 K/UL

## 2023-09-22 ENCOUNTER — TRANSCRIPTION ENCOUNTER (OUTPATIENT)
Age: 23
End: 2023-09-22

## 2023-09-22 DIAGNOSIS — R63.5 ABNORMAL WEIGHT GAIN: ICD-10-CM

## 2023-09-22 RX ORDER — SEMAGLUTIDE 0.25 MG/.5ML
0.25 INJECTION, SOLUTION SUBCUTANEOUS
Qty: 1 | Refills: 0 | Status: ACTIVE | COMMUNITY
Start: 2023-09-22 | End: 1900-01-01

## 2023-10-18 ENCOUNTER — TRANSCRIPTION ENCOUNTER (OUTPATIENT)
Age: 23
End: 2023-10-18

## 2023-10-18 ENCOUNTER — NON-APPOINTMENT (OUTPATIENT)
Age: 23
End: 2023-10-18

## 2024-01-16 ENCOUNTER — TRANSCRIPTION ENCOUNTER (OUTPATIENT)
Age: 24
End: 2024-01-16

## 2024-08-20 ENCOUNTER — APPOINTMENT (OUTPATIENT)
Dept: OPHTHALMOLOGY | Facility: CLINIC | Age: 24
End: 2024-08-20

## 2024-11-08 ENCOUNTER — APPOINTMENT (OUTPATIENT)
Dept: OPHTHALMOLOGY | Facility: CLINIC | Age: 24
End: 2024-11-08

## 2024-11-13 ENCOUNTER — TRANSCRIPTION ENCOUNTER (OUTPATIENT)
Age: 24
End: 2024-11-13

## 2024-11-13 RX ORDER — PHENTERMINE HYDROCHLORIDE 15 MG/1
15 CAPSULE ORAL
Qty: 30 | Refills: 0 | Status: ACTIVE | COMMUNITY
Start: 2024-11-13 | End: 1900-01-01

## 2024-12-05 ENCOUNTER — LABORATORY RESULT (OUTPATIENT)
Age: 24
End: 2024-12-05

## 2024-12-05 ENCOUNTER — APPOINTMENT (OUTPATIENT)
Dept: INTERNAL MEDICINE | Facility: CLINIC | Age: 24
End: 2024-12-05
Payer: COMMERCIAL

## 2024-12-05 VITALS
OXYGEN SATURATION: 99 % | HEIGHT: 65 IN | HEART RATE: 83 BPM | BODY MASS INDEX: 25.83 KG/M2 | WEIGHT: 155 LBS | SYSTOLIC BLOOD PRESSURE: 118 MMHG | TEMPERATURE: 97.6 F | DIASTOLIC BLOOD PRESSURE: 75 MMHG

## 2024-12-05 DIAGNOSIS — M25.532 PAIN IN RIGHT WRIST: ICD-10-CM

## 2024-12-05 DIAGNOSIS — M25.531 PAIN IN RIGHT WRIST: ICD-10-CM

## 2024-12-05 DIAGNOSIS — L65.9 NONSCARRING HAIR LOSS, UNSPECIFIED: ICD-10-CM

## 2024-12-05 DIAGNOSIS — Z00.00 ENCOUNTER FOR GENERAL ADULT MEDICAL EXAMINATION W/OUT ABNORMAL FINDINGS: ICD-10-CM

## 2024-12-05 DIAGNOSIS — E55.9 VITAMIN D DEFICIENCY, UNSPECIFIED: ICD-10-CM

## 2024-12-05 PROCEDURE — 99395 PREV VISIT EST AGE 18-39: CPT

## 2024-12-05 PROCEDURE — G0444 DEPRESSION SCREEN ANNUAL: CPT | Mod: 59

## 2024-12-05 PROCEDURE — 99213 OFFICE O/P EST LOW 20 MIN: CPT | Mod: 25

## 2024-12-05 RX ORDER — SEMAGLUTIDE 0.25 MG/.5ML
0.25 INJECTION, SOLUTION SUBCUTANEOUS
Qty: 1 | Refills: 1 | Status: ACTIVE | COMMUNITY
Start: 2024-12-05 | End: 1900-01-01

## 2024-12-05 RX ORDER — ERGOCALCIFEROL 1.25 MG/1
1.25 MG CAPSULE ORAL
Qty: 12 | Refills: 0 | Status: ACTIVE | COMMUNITY
Start: 2024-12-05 | End: 2025-03-05

## 2024-12-10 ENCOUNTER — TRANSCRIPTION ENCOUNTER (OUTPATIENT)
Age: 24
End: 2024-12-10

## 2024-12-10 LAB
25(OH)D3 SERPL-MCNC: 20.1 NG/ML
ALBUMIN SERPL ELPH-MCNC: 4.6 G/DL
ALP BLD-CCNC: 71 U/L
ALT SERPL-CCNC: 15 U/L
ANA SER IF-ACNC: NEGATIVE
ANION GAP SERPL CALC-SCNC: 12 MMOL/L
APPEARANCE: CLEAR
AST SERPL-CCNC: 13 U/L
BASOPHILS # BLD AUTO: 0.05 K/UL
BASOPHILS NFR BLD AUTO: 0.6 %
BILIRUB SERPL-MCNC: 0.2 MG/DL
BILIRUBIN URINE: NEGATIVE
BLOOD URINE: NEGATIVE
BUN SERPL-MCNC: 9 MG/DL
CALCIUM SERPL-MCNC: 9.3 MG/DL
CCP AB SER IA-ACNC: <8 U/ML
CHLORIDE SERPL-SCNC: 102 MMOL/L
CHOLEST SERPL-MCNC: 135 MG/DL
CO2 SERPL-SCNC: 24 MMOL/L
COLOR: YELLOW
CREAT SERPL-MCNC: 0.69 MG/DL
EGFR: 124 ML/MIN/1.73M2
EOSINOPHIL # BLD AUTO: 0.15 K/UL
EOSINOPHIL NFR BLD AUTO: 1.9 %
ESTIMATED AVERAGE GLUCOSE: 97 MG/DL
FERRITIN SERPL-MCNC: 38 NG/ML
GLUCOSE QUALITATIVE U: NEGATIVE MG/DL
GLUCOSE SERPL-MCNC: 74 MG/DL
HBA1C MFR BLD HPLC: 5 %
HCT VFR BLD CALC: 40.1 %
HDLC SERPL-MCNC: 49 MG/DL
HGB BLD-MCNC: 13.1 G/DL
IMM GRANULOCYTES NFR BLD AUTO: 0.5 %
KETONES URINE: NEGATIVE MG/DL
LDLC SERPL CALC-MCNC: 76 MG/DL
LEUKOCYTE ESTERASE URINE: ABNORMAL
LYMPHOCYTES # BLD AUTO: 2.55 K/UL
LYMPHOCYTES NFR BLD AUTO: 32.4 %
MAN DIFF?: NORMAL
MCHC RBC-ENTMCNC: 30.9 PG
MCHC RBC-ENTMCNC: 32.7 G/DL
MCV RBC AUTO: 94.6 FL
MONOCYTES # BLD AUTO: 0.57 K/UL
MONOCYTES NFR BLD AUTO: 7.3 %
NEUTROPHILS # BLD AUTO: 4.5 K/UL
NEUTROPHILS NFR BLD AUTO: 57.3 %
NITRITE URINE: NEGATIVE
NONHDLC SERPL-MCNC: 85 MG/DL
PH URINE: 5.5
PLATELET # BLD AUTO: 250 K/UL
POTASSIUM SERPL-SCNC: 4.3 MMOL/L
PROT SERPL-MCNC: 7.3 G/DL
PROTEIN URINE: NEGATIVE MG/DL
RBC # BLD: 4.24 M/UL
RBC # FLD: 11.9 %
RF+CCP IGG SER-IMP: NEGATIVE
RHEUMATOID FACT SER QL: <10 IU/ML
SODIUM SERPL-SCNC: 138 MMOL/L
SPECIFIC GRAVITY URINE: 1.02
THYROGLOB AB SERPL-ACNC: <15 IU/ML
THYROPEROXIDASE AB SERPL IA-ACNC: 12 IU/ML
TRIGL SERPL-MCNC: 37 MG/DL
TSH SERPL-ACNC: 2.12 UIU/ML
UROBILINOGEN URINE: 0.2 MG/DL
VIT B12 SERPL-MCNC: 894 PG/ML
WBC # FLD AUTO: 7.86 K/UL

## 2024-12-10 RX ORDER — CHLORHEXIDINE GLUCONATE 4 %
325 (65 FE) LIQUID (ML) TOPICAL
Qty: 90 | Refills: 1 | Status: ACTIVE | COMMUNITY
Start: 2024-12-10 | End: 1900-01-01

## 2024-12-10 RX ORDER — ERGOCALCIFEROL 1.25 MG/1
1.25 MG CAPSULE ORAL
Qty: 12 | Refills: 0 | Status: ACTIVE | COMMUNITY
Start: 2024-12-10 | End: 1900-01-01

## 2025-01-15 ENCOUNTER — TRANSCRIPTION ENCOUNTER (OUTPATIENT)
Age: 25
End: 2025-01-15

## 2025-02-11 ENCOUNTER — EMERGENCY (EMERGENCY)
Facility: HOSPITAL | Age: 25
LOS: 1 days | Discharge: HOME | End: 2025-02-11
Admitting: EMERGENCY MEDICINE
Payer: COMMERCIAL

## 2025-02-11 VITALS
WEIGHT: 149.91 LBS | OXYGEN SATURATION: 100 % | HEART RATE: 73 BPM | SYSTOLIC BLOOD PRESSURE: 125 MMHG | TEMPERATURE: 98 F | RESPIRATION RATE: 18 BRPM | DIASTOLIC BLOOD PRESSURE: 80 MMHG

## 2025-02-11 VITALS
DIASTOLIC BLOOD PRESSURE: 74 MMHG | TEMPERATURE: 98 F | RESPIRATION RATE: 16 BRPM | HEART RATE: 67 BPM | OXYGEN SATURATION: 98 % | SYSTOLIC BLOOD PRESSURE: 113 MMHG

## 2025-02-11 LAB
ALBUMIN SERPL ELPH-MCNC: 4.4 G/DL — SIGNIFICANT CHANGE UP (ref 3.3–5)
ALP SERPL-CCNC: 71 U/L — SIGNIFICANT CHANGE UP (ref 40–120)
ALT FLD-CCNC: 13 U/L — SIGNIFICANT CHANGE UP (ref 4–33)
ANION GAP SERPL CALC-SCNC: 10 MMOL/L — SIGNIFICANT CHANGE UP (ref 7–14)
AST SERPL-CCNC: 21 U/L — SIGNIFICANT CHANGE UP (ref 4–32)
BASOPHILS # BLD AUTO: 0.05 K/UL — SIGNIFICANT CHANGE UP (ref 0–0.2)
BASOPHILS NFR BLD AUTO: 0.7 % — SIGNIFICANT CHANGE UP (ref 0–2)
BILIRUB SERPL-MCNC: <0.2 MG/DL — SIGNIFICANT CHANGE UP (ref 0.2–1.2)
BUN SERPL-MCNC: 8 MG/DL — SIGNIFICANT CHANGE UP (ref 7–23)
CALCIUM SERPL-MCNC: 9.1 MG/DL — SIGNIFICANT CHANGE UP (ref 8.4–10.5)
CHLORIDE SERPL-SCNC: 105 MMOL/L — SIGNIFICANT CHANGE UP (ref 98–107)
CO2 SERPL-SCNC: 23 MMOL/L — SIGNIFICANT CHANGE UP (ref 22–31)
CREAT SERPL-MCNC: 0.6 MG/DL — SIGNIFICANT CHANGE UP (ref 0.5–1.3)
D DIMER BLD IA.RAPID-MCNC: <150 NG/ML DDU — SIGNIFICANT CHANGE UP
EGFR: 128 ML/MIN/1.73M2 — SIGNIFICANT CHANGE UP
EOSINOPHIL # BLD AUTO: 0.06 K/UL — SIGNIFICANT CHANGE UP (ref 0–0.5)
EOSINOPHIL NFR BLD AUTO: 0.8 % — SIGNIFICANT CHANGE UP (ref 0–6)
GLUCOSE SERPL-MCNC: 89 MG/DL — SIGNIFICANT CHANGE UP (ref 70–99)
HCT VFR BLD CALC: 41.8 % — SIGNIFICANT CHANGE UP (ref 34.5–45)
HGB BLD-MCNC: 14.1 G/DL — SIGNIFICANT CHANGE UP (ref 11.5–15.5)
IANC: 4.96 K/UL — SIGNIFICANT CHANGE UP (ref 1.8–7.4)
IMM GRANULOCYTES NFR BLD AUTO: 0.3 % — SIGNIFICANT CHANGE UP (ref 0–0.9)
LYMPHOCYTES # BLD AUTO: 1.8 K/UL — SIGNIFICANT CHANGE UP (ref 1–3.3)
LYMPHOCYTES # BLD AUTO: 24.7 % — SIGNIFICANT CHANGE UP (ref 13–44)
MCHC RBC-ENTMCNC: 30.6 PG — SIGNIFICANT CHANGE UP (ref 27–34)
MCHC RBC-ENTMCNC: 33.7 G/DL — SIGNIFICANT CHANGE UP (ref 32–36)
MCV RBC AUTO: 90.7 FL — SIGNIFICANT CHANGE UP (ref 80–100)
MONOCYTES # BLD AUTO: 0.39 K/UL — SIGNIFICANT CHANGE UP (ref 0–0.9)
MONOCYTES NFR BLD AUTO: 5.4 % — SIGNIFICANT CHANGE UP (ref 2–14)
NEUTROPHILS # BLD AUTO: 4.96 K/UL — SIGNIFICANT CHANGE UP (ref 1.8–7.4)
NEUTROPHILS NFR BLD AUTO: 68.1 % — SIGNIFICANT CHANGE UP (ref 43–77)
NRBC # BLD AUTO: 0 K/UL — SIGNIFICANT CHANGE UP (ref 0–0)
NRBC # FLD: 0 K/UL — SIGNIFICANT CHANGE UP (ref 0–0)
NRBC BLD AUTO-RTO: 0 /100 WBCS — SIGNIFICANT CHANGE UP (ref 0–0)
PLATELET # BLD AUTO: 262 K/UL — SIGNIFICANT CHANGE UP (ref 150–400)
POTASSIUM SERPL-MCNC: 4.7 MMOL/L — SIGNIFICANT CHANGE UP (ref 3.5–5.3)
POTASSIUM SERPL-SCNC: 4.7 MMOL/L — SIGNIFICANT CHANGE UP (ref 3.5–5.3)
PROT SERPL-MCNC: 7.4 G/DL — SIGNIFICANT CHANGE UP (ref 6–8.3)
RBC # BLD: 4.61 M/UL — SIGNIFICANT CHANGE UP (ref 3.8–5.2)
RBC # FLD: 11.9 % — SIGNIFICANT CHANGE UP (ref 10.3–14.5)
SODIUM SERPL-SCNC: 138 MMOL/L — SIGNIFICANT CHANGE UP (ref 135–145)
TROPONIN T, HIGH SENSITIVITY RESULT: <6 NG/L — SIGNIFICANT CHANGE UP
WBC # BLD: 7.28 K/UL — SIGNIFICANT CHANGE UP (ref 3.8–10.5)
WBC # FLD AUTO: 7.28 K/UL — SIGNIFICANT CHANGE UP (ref 3.8–10.5)

## 2025-02-11 PROCEDURE — 93010 ELECTROCARDIOGRAM REPORT: CPT

## 2025-02-11 PROCEDURE — 99285 EMERGENCY DEPT VISIT HI MDM: CPT

## 2025-02-11 PROCEDURE — 71046 X-RAY EXAM CHEST 2 VIEWS: CPT | Mod: 26

## 2025-02-11 RX ORDER — METOCLOPRAMIDE 10 MG/1
10 TABLET ORAL ONCE
Refills: 0 | Status: COMPLETED | OUTPATIENT
Start: 2025-02-11 | End: 2025-02-11

## 2025-02-11 RX ORDER — KETOROLAC TROMETHAMINE 10 MG
15 TABLET ORAL ONCE
Refills: 0 | Status: DISCONTINUED | OUTPATIENT
Start: 2025-02-11 | End: 2025-02-11

## 2025-02-11 RX ORDER — IBUPROFEN 600 MG/1
1 TABLET, FILM COATED ORAL
Qty: 28 | Refills: 0
Start: 2025-02-11 | End: 2025-02-17

## 2025-02-11 RX ORDER — BACTERIOSTATIC SODIUM CHLORIDE 0.9 %
1000 VIAL (ML) INJECTION ONCE
Refills: 0 | Status: COMPLETED | OUTPATIENT
Start: 2025-02-11 | End: 2025-02-11

## 2025-02-11 RX ADMIN — Medication 15 MILLIGRAM(S): at 12:47

## 2025-02-11 RX ADMIN — Medication 1000 MILLILITER(S): at 12:47

## 2025-02-11 RX ADMIN — METOCLOPRAMIDE 10 MILLIGRAM(S): 10 TABLET ORAL at 12:46

## 2025-02-11 NOTE — ED PROVIDER NOTE - CLINICAL SUMMARY MEDICAL DECISION MAKING FREE TEXT BOX
24 yo F with PMH of anemia, accompanied by partner, presents to ED c/o right side chest pain since the weekend, x4 days; headache a/w dizziness and blurry vision x1 wk. Pt presents with multiple complaints. 1. right chest pain, non-exertional, currently no chest pain, EKG nonischemic. The patient's risk factors for ACS were reviewed as well as the EKG.  The CXR assists in r/o Pneumonia, Pneumothorax, Esophageal Tears.  The patient does not appear to have a Pulmonary Embolism based on the Wells Score and there is no apparent DVT.  PERC negative. 2. headache w/ dizziness , blurry vision and photophobia,, no focal neuro deficits. no red flags, likely migraine headache. 3. right jaw pain w/ tinnitus, suspect TMJ arthralgia. Plan: labs, pain control, CXR, and reassessment, likely outpatient f/u with neuro, ENT and PCP.

## 2025-02-11 NOTE — ED PROVIDER NOTE - OBJECTIVE STATEMENT
24 yo F with PMH of anemia, accompanied by partner, presents to ED c/o right side chest pain since the weekend, x4 days; headache a/w dizziness and blurry vision x1 wk. Reports right side chest pressure, currently none, last episode yesterday, non exertional, non pleuritic, worse with movements, no rash. States having intermittent headache, pressure, behind both eyes, a/w lightheadedness w/ fuzzy vision with starring, nausea; took Aspirin w/o improvement. Admits having pain over right jaw and ringing to right ear. Denies any fever, chills, head trauma, LOC, anticoagulation, neck pain, vomiting, diarrhea, abd pain, sob, palpitation, leg swelling, hx of DVT/PE, birth control use, family hx of brain aneurysm, weakness, numbness, or any other complaints.

## 2025-02-11 NOTE — ED PROVIDER NOTE - NSFOLLOWUPINSTRUCTIONS_ED_ALL_ED_FT
Rest, drink plenty of fluids.  Advance activity as tolerated.  Continue all previously prescribed medications as directed.  Follow up with your primary care physician in 48-72 hours- bring copies of your results.  Return to the ER for worsening or persistent symptoms, and/or ANY NEW OR CONCERNING SYMPTOMS. If you have issues obtaining follow up, please call: [phone] to obtain a doctor or specialist who takes your insurance in your area.     Take Tylenol 650mg (Two 325 mg pills) every 4-6 hours as needed for pain.   Take Motrin/Ibuprofen 600 mg every 6-8 hours as needed for moderate pain -- take with food.     Our Discharge Lounge will contact you for appointment with Neurology and ENT

## 2025-02-11 NOTE — ED PROVIDER NOTE - PATIENT PORTAL LINK FT
You can access the FollowMyHealth Patient Portal offered by [facility] by registering at the following website: [email or web address]. By joining [facility]’s FollowMyHealth portal, you will also be able to view your health information using other applications (apps) compatible with our system.

## 2025-02-11 NOTE — ED PROVIDER NOTE - MDM ORDERS SUBMITTED SELECTION
Detail Level: Simple
Patient Specific Counseling (Will Not Stick From Patient To Patient): Rec bpo 10% wash daily
Detail Level: Zone
Imaging Studies/Medications

## 2025-02-11 NOTE — ED ADULT TRIAGE NOTE - CHIEF COMPLAINT QUOTE
chest pain, headache and dizziness x 1 week, speaking clear, ambulatory with steady gait, RR even and unlabored h/o heart murmur, anemia

## 2025-02-11 NOTE — ED ADULT NURSE NOTE - OBJECTIVE STATEMENT
Pt c/o headache and nausea x 1 week, with intermittent chest pain. No neuro deficits noted. Denies chest pain at present. No SOB. Respirations are even and unlabored. Pt appears comfortable at time of initial assessment. No apparent distress noted. Pt refusing hospital gown at present time. 20g IV placed in L hand, labs drawn as ordered. Medications and fluids administered as ordered (see EMAR). Safety maintained. Awaiting results/further orders/plan/dispo.

## 2025-02-11 NOTE — ED PROVIDER NOTE - PROGRESS NOTE DETAILS
PA FRANCISCA: Patient reassessed, sitting comfortably in chair in NAD, denies any complaints. States feeling better, symptoms improved. labs reviewed, negative d dimer, trop<6. CXR clear lungs. Pt is medically stable for discharge and follow up with PMD, ENT, neurology. The patient was given verbal and written discharge instructions. Specifically, instructions when to return to the ED and when to seek follow-up from their pcp was discussed. Any specialty follow-up was discussed, including how to make an appointment.  Instructions were discussed in simple, plain language and was understood by the patient. The patient understands that should their symptoms worsen or any new symptoms arise, they should return to the ED immediately for further evaluation. All pt's questions were answered. Patient verbalizes understanding.

## 2025-02-11 NOTE — ED PROVIDER NOTE - CARE PLAN
Principal Discharge DX:	Chest pain  Secondary Diagnosis:	Headache  Secondary Diagnosis:	TMJ arthralgia   1

## 2025-02-12 ENCOUNTER — TRANSCRIPTION ENCOUNTER (OUTPATIENT)
Age: 25
End: 2025-02-12

## 2025-02-12 PROBLEM — D64.9 ANEMIA, UNSPECIFIED: Chronic | Status: ACTIVE | Noted: 2025-02-11

## 2025-02-14 ENCOUNTER — NON-APPOINTMENT (OUTPATIENT)
Age: 25
End: 2025-02-14

## 2025-02-14 ENCOUNTER — APPOINTMENT (OUTPATIENT)
Dept: OPHTHALMOLOGY | Facility: CLINIC | Age: 25
End: 2025-02-14
Payer: COMMERCIAL

## 2025-02-14 PROCEDURE — 92004 COMPRE OPH EXAM NEW PT 1/>: CPT

## 2025-03-03 ENCOUNTER — TRANSCRIPTION ENCOUNTER (OUTPATIENT)
Age: 25
End: 2025-03-03

## 2025-04-05 ENCOUNTER — TRANSCRIPTION ENCOUNTER (OUTPATIENT)
Age: 25
End: 2025-04-05

## 2025-06-02 ENCOUNTER — TRANSCRIPTION ENCOUNTER (OUTPATIENT)
Age: 25
End: 2025-06-02

## 2025-06-12 LAB
HBV SURFACE AB SER QL: NONREACTIVE
VZV AB TITR SER: POSITIVE
VZV IGG SER IF-ACNC: 12.5 S/CO

## 2025-06-13 LAB
HAEM INFLU B AB SER-MCNC: 7.62 UG/ML
MEV IGG FLD QL IA: 207 AU/ML
MEV IGG+IGM SER-IMP: POSITIVE
MUV AB SER-ACNC: POSITIVE
MUV IGG SER QL IA: 59 AU/ML
RUBV IGG FLD-ACNC: 2.18 INDEX
RUBV IGG SER-IMP: POSITIVE
VZV AB TITR SER: POSITIVE
VZV IGG SER IF-ACNC: 12.7 S/CO

## 2025-06-16 ENCOUNTER — TRANSCRIPTION ENCOUNTER (OUTPATIENT)
Age: 25
End: 2025-06-16

## 2025-06-19 ENCOUNTER — APPOINTMENT (OUTPATIENT)
Dept: INTERNAL MEDICINE | Facility: CLINIC | Age: 25
End: 2025-06-19
Payer: COMMERCIAL

## 2025-06-19 PROCEDURE — 90746 HEPB VACCINE 3 DOSE ADULT IM: CPT

## 2025-06-19 PROCEDURE — G0010: CPT

## 2025-07-17 ENCOUNTER — APPOINTMENT (OUTPATIENT)
Dept: INTERNAL MEDICINE | Facility: CLINIC | Age: 25
End: 2025-07-17

## 2025-08-11 ENCOUNTER — TRANSCRIPTION ENCOUNTER (OUTPATIENT)
Age: 25
End: 2025-08-11